# Patient Record
(demographics unavailable — no encounter records)

---

## 2024-11-08 NOTE — HISTORY OF PRESENT ILLNESS
[FreeTextEntry1] : RPA - Hair Loss / Cystic Acne [de-identified] : Adriana Brand 35 y/o F presents for F/U of hair loss and cystic acne. LV 8/6/24  hair loss:  sees improvement, noticing less shedding. noticing less hair around nipple and chin where she used to have more hair.  using topical minoxidil and spironolactone 100mg (started 8/2024)  taking iron supplement daily  Seb derm:  kcn shampoo helps, wants refill  #acne: doing better.  also using Tretinoin every other day.  Hyperpigmentation from prior acne on face and back/shoulders

## 2024-11-08 NOTE — ASSESSMENT
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 - improved Pattern Alopecia is genetically pre-determined and slowly progressive.   Bloodwork previously checked: CBC, ferritin, testosterone DHEA-S, TSH, vitamin D Worsened after OCPs, stopped in 12/2023.   -recommended starting Minoxidil 5% foam daily for 6 months. -start spironolactone 100mg daily (start 50mg qd x 2 weeks first). SED including hypotension, HA, GI symptom, changes in mood/libido/menstrual cycle, hyperkalemia.  -recheck TSH today  #High risk medication  -8/2024 BMP and BP -WNL  #Iron deficiency  -c/w ferrous sulfate 65mg daily  #Cystic acne - stable #PIH Education provided, reviewed the importance to avoid manipulation of acne -c/w spironolactone as above -c/w tret .025% TIW  SED including skin irritation and peeling. Discontinue if pregnant or breastfeeding. Proper application technique reviewed. -start azelaic acid 15% gel BID to AA of acne and hyperpigmentation  #Seborrheic dermatitis - improved Patient reassured this is a benign dermatitis caused by yeast overgrowth.  Discussed the typical chronic nature of this condition with periods of waxing and waning.   For the scalp: - c/w Ketoconazole 2% shampoo to entire scalp every other day.   Patient was instructed to leave on for 5-10 minutes before rinsing off. refilled - c/w Lidex solution to affected areas BID for 2 weeks on 2 week off as needed for flaring. refilled  RTC 3 months

## 2024-11-08 NOTE — HISTORY OF PRESENT ILLNESS
[FreeTextEntry1] : RPA - Hair Loss / Cystic Acne [de-identified] : Adriana Brand 33 y/o F presents for F/U of hair loss and cystic acne. LV 8/6/24  hair loss:  sees improvement, noticing less shedding. noticing less hair around nipple and chin where she used to have more hair.  using topical minoxidil and spironolactone 100mg (started 8/2024)  taking iron supplement daily  Seb derm:  kcn shampoo helps, wants refill  #acne: doing better.  also using Tretinoin every other day.  Hyperpigmentation from prior acne on face and back/shoulders

## 2024-11-08 NOTE — PHYSICAL EXAM
[Alert] : alert [Oriented x 3] : ~L oriented x 3 [Well Nourished] : well nourished [Conjunctiva Non-injected] : conjunctiva non-injected [No Visual Lymphadenopathy] : no visual  lymphadenopathy [No Clubbing] : no clubbing [No Edema] : no edema [No Bromhidrosis] : no bromhidrosis [No Chromhidrosis] : no chromhidrosis [Hair] : Hair [Scalp] : Scalp [Face] : Face [FreeTextEntry3] : scalp: nonscarring alopecia, improved hair denseity  temples and cheeks: hyperpigmented macules

## 2025-02-14 NOTE — PHYSICAL EXAM
[Alert] : alert [Oriented x 3] : ~L oriented x 3 [Well Nourished] : well nourished [Conjunctiva Non-injected] : conjunctiva non-injected [No Visual Lymphadenopathy] : no visual  lymphadenopathy [No Clubbing] : no clubbing [No Edema] : no edema [No Bromhidrosis] : no bromhidrosis [No Chromhidrosis] : no chromhidrosis [Hair] : Hair [Scalp] : Scalp [Face] : Face [FreeTextEntry3] : scalp: nonscarring alopecia, improved hair density. small crusted erosion on frontal scalp. diffuse mild erythema and scaling  temples and cheeks: hyperpigmented macules

## 2025-02-14 NOTE — ASSESSMENT
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 - improved Pattern Alopecia is genetically pre-determined and slowly progressive.   Bloodwork previously checked: CBC, ferritin, testosterone DHEA-S, TSH, vitamin D Worsened after OCPs, stopped in 12/2023.   -recommended starting Minoxidil 5% foam daily for 6 months. -c/w spironolactone 100mg daily (start 50mg qd x 2 weeks first). SED including hypotension, HA, GI symptom, changes in mood/libido/menstrual cycle, hyperkalemia.  Will hold off on increasing dose due to irregular menses -recheck ferritin today  #High risk medication  -8/2024 BMP and BP -WNL  #Iron deficiency  -c/w ferrous sulfate 65mg daily  #Cystic acne - stable #PIH Education provided, reviewed the importance to avoid manipulation of acne prior tx: tret .025.  unable to get azelaic acid, too expensive -c/w spironolactone as above -change tret from 0.025-> 0.05% TIW -start winlevi bid. rx sent to [facility]  #Seborrheic dermatitis - improved but with breakthrough Patient reassured this is a benign dermatitis caused by yeast overgrowth.  Discussed the typical chronic nature of this condition with periods of waxing and waning.   For the scalp: - start using Ketoconazole 2% shampoo more regularly. 2-3x/weekly - c/w Lidex solution to affected areas BID for 2 weeks on 2 week off as needed for flaring. refilled zoryve foam discussed, she declines for now.   RTC 3 months

## 2025-02-14 NOTE — HISTORY OF PRESENT ILLNESS
[FreeTextEntry1] : RPA - Hair Loss / Cystic Acne/ Sebb derm [de-identified] : Adriana Brand 35 y/o F presents for F/U  Overall, everything is better but still flares around menses.  #Dermatitis on scalp comes and goes uses medication when notice dermatitis flaring up. #hair loss: never started minoxidil , still on iron. jamel 100mg daily. Overall improved but notices increased shedding during period. Getting period every other month now, wants to know if its bc of spironolactone. sees gyn for PMDD but has not had follow up recently.  #Seb derm: using kcn shampoo but only as needed. Gets more painful bumps during menses. improves w/ fluocinoinde.  #acne: on tret .025 TIW , azelaic acid was too expensive. breakthroughs during menses ____________________________________________  hair loss and cystic acne. LV 8/6/24 hair loss:  sees improvement, noticing less shedding. noticing less hair around nipple and chin where she used to have more hair.  using topical minoxidil and spironolactone 100mg (started 8/2024)  taking iron supplement daily  Seb derm:  kcn shampoo helps, wants refill  #acne: doing better.  also using Tretinoin every other day.  Hyperpigmentation from prior acne on face and back/shoulders

## 2025-02-23 NOTE — HEALTH RISK ASSESSMENT
[Patient reported PAP Smear was normal] : Patient reported PAP Smear was normal [Monthly or less (1 pt)] : Monthly or less (1 point) [1 or 2 (0 pts)] : 1 or 2 (0 points) [Never (0 pts)] : Never (0 points) [No falls in past year] : Patient reported no falls in the past year [0] : 2) Feeling down, depressed, or hopeless: Not at all (0) [Never] : Never [NO] : No [HIV test declined] : HIV test declined [Hepatitis C test declined] : Hepatitis C test declined [None] : None [Alone] : lives alone [Employed] : employed [Single] : single [Feels Safe at Home] : Feels safe at home [Fully functional (bathing, dressing, toileting, transferring, walking, feeding)] : Fully functional (bathing, dressing, toileting, transferring, walking, feeding) [Fully functional (using the telephone, shopping, preparing meals, housekeeping, doing laundry, using] : Fully functional and needs no help or supervision to perform IADLs (using the telephone, shopping, preparing meals, housekeeping, doing laundry, using transportation, managing medications and managing finances) [Smoke Detector] : smoke detector [Carbon Monoxide Detector] : carbon monoxide detector [Seat Belt] :  uses seat belt [Name: ___] : Health Care Proxy's Name: [unfilled]  [Relationship: ___] : Relationship: [unfilled] [Audit-CScore] : 1 [de-identified] : very active on her jobs - lives on 5th floor walk-up - always active [de-identified] : regular [XDN1Cwbjo] : 0 [EyeExamDate] : 01/ 2018 [Change in mental status noted] : No change in mental status noted [Sexually Active] : not sexually active [MammogramComments] : new britta hx breast Ca.  [PapSmearDate] : 04/23 [BoneDensityComments] : na [ColonoscopyComments] : na [de-identified] : occ tinnitus - ~ once a month [AdvancecareDate] : 02/25

## 2025-02-23 NOTE — PHYSICAL EXAM
[No Acute Distress] : no acute distress [Well-Appearing] : well-appearing [Normal Sclera/Conjunctiva] : normal sclera/conjunctiva [PERRL] : pupils equal round and reactive to light [EOMI] : extraocular movements intact [No Lymphadenopathy] : no lymphadenopathy [No Respiratory Distress] : no respiratory distress  [Clear to Auscultation] : lungs were clear to auscultation bilaterally [Normal Rate] : normal rate  [Regular Rhythm] : with a regular rhythm [Normal S1, S2] : normal S1 and S2 [No Murmur] : no murmur heard [Pedal Pulses Present] : the pedal pulses are present [No Edema] : there was no peripheral edema [Normal Appearance] : normal in appearance [No Masses] : no palpable masses [No Axillary Lymphadenopathy] : no axillary lymphadenopathy [Soft] : abdomen soft [Non Tender] : non-tender [Normal Bowel Sounds] : normal bowel sounds [Normal Axillary Nodes] : no axillary lymphadenopathy [Normal Anterior Cervical Nodes] : no anterior cervical lymphadenopathy [No CVA Tenderness] : no CVA  tenderness [No Spinal Tenderness] : no spinal tenderness [No Joint Swelling] : no joint swelling [Grossly Normal Strength/Tone] : grossly normal strength/tone [No Rash] : no rash [No Focal Deficits] : no focal deficits [Deep Tendon Reflexes (DTR)] : deep tendon reflexes were 2+ and symmetric [Normal Affect] : the affect was normal [de-identified] : neck non-tender [de-identified] : no skin changes

## 2025-02-23 NOTE — HISTORY OF PRESENT ILLNESS
[FreeTextEntry1] : annual CPE [de-identified] : 33 yo F with h/o migraine HA's, irreg menses, PMDD  2/18/25 RE alopecia: has been working with Derm: started on spironolactone last year - Currently on 100 mg QD - has been helping but not as much as she hoped. Dr Cruz suggested increasing to 100 mg BID. Current dose has made her occasionally lightheaded and her perioeds irregular. She is also using topical minoxidil.   Prior 1/2/24 Recently moved to [facility] - now can walk to work. loves it there.  On OCP last April - first few months were great. Afterwards had a lot of trouble adjusting - intermittent spotting. And all PMS issues recurred if she missed even one pill.  now off for for a few weeks.  At this time: energy level fine, no sig PMS sx, one migraine but was on rainy day which was normal.  Still concerned about her hair - feels it is still thinning.  Was taking supplements: b complex, Vit D, iron - stopped all except uses iron patch. also still using biotin.  Reports scalp also getting inflamed intermittently.   Prior 6/14/23 Recently seen by GYN with confirmation of diagnosis of PMDD -started on oral contraceptive as first-line treatment. Started in April so on for ~ 2 months so far.  She reports that since starting the oral contraceptive, she has noticed a significant improvement in her quality of life.  She is sleeping better, her appetite has normalized, and her sense of overall fatigue has resolved.  She reports initially having some spotting in between periods but this has resolved.  Also has noticed small amount of facial acne since starting the OCP  -as per GYN, this may resolve as her body gets more used to the medication. She is concerned about hair loss since this has been an issue for some time.  Has read that OCPs can potentially make hair loss worse. Prior 12/20/23 for past 2+ years - pervasive but fluctuating fatigue, paresthesias in hands and feet, intermittent weakness, feeling jittery, difficulty concentrating.  W/u in progress:  initially seen by Rheum , Dr Wheeler - PE not s/o specific rheumatologic dx -  labs sent to evaluate for possible rheum causes. Was also advised by Rheum to see neurology and psychiatry.  Neuro: Dr Marin Hooker: w/u in progress. Suggested if neuro and Rheum w/u all neg - suggested eval at specialized center for chronic fatigue syndrome such as one at [facility].   By Ms ESQUIVEL's report - these episodes generally start before her menses and then resolves when menses done. Also c/o hair loss.  Started Aug 2021.   Will do bloodwork today to r/o hypothyroid, screen for DM, check lytes, screen for B12/folate def.  RE timing of episodes: consider premenstrual dysphoric syndrome - discussed with Ms ESQUIVEL. PHQ completed today. She will do PHQ again during menses. f/u to evaluate and potentially treat if significant difference.  RE wt loss - increase caloric intake . add snacks and start eating breakfast.  RE occ ringing in her ears: see ENT for tinnitus RE HPM: due for PAP = she will make appt - either here in the building or at [facility].  RE change in vision: referred to ophtho - she will make appt at [facility]

## 2025-02-23 NOTE — ASSESSMENT
[FreeTextEntry1] : 31 yo F with h/o migraine HA's, irreg menses, PMDD  RE alopecia: has been working with Derm: started on spironolactone last year - Currently on 100 mg QD - has been helping but not as much as she hoped. Dr Cruz suggested increasing to 100 mg BID. Current dose has made her occasionally lightheaded and her periods irregular. She is also using topical minoxidil. Discussed risk v benefit of increased dose. She will discuss further with Derm.   RE irreg menses: f/u with Gyn  RE family hx of breast Ca: discussed option of referral to [facility] Wellness and Cancer Prevention Program. She declines for now. Suggested she s/w mom's oncologist regarding when to begin screening and what modalities.  I conducted an annual depression screen using the PHQ 2/9 and discussed results with the patient during this visit.  Screening not suggestive of diagnosis of MDD.   Annual alcohol screen completed this visit using AUDIT C screening tool and results discussed with patient.  Alcohol use within healthy limits.  Healthy drinking guidelines shared with patient t (Female and male overage 65 no more than 3 SSD on any day, no more than 7 per week). Positive reinforcement provided given patient currently within healthy guidelines.

## 2025-02-23 NOTE — HEALTH RISK ASSESSMENT
[Patient reported PAP Smear was normal] : Patient reported PAP Smear was normal [Monthly or less (1 pt)] : Monthly or less (1 point) [1 or 2 (0 pts)] : 1 or 2 (0 points) [Never (0 pts)] : Never (0 points) [No falls in past year] : Patient reported no falls in the past year [0] : 2) Feeling down, depressed, or hopeless: Not at all (0) [Never] : Never [NO] : No [HIV test declined] : HIV test declined [Hepatitis C test declined] : Hepatitis C test declined [None] : None [Alone] : lives alone [Employed] : employed [Single] : single [Feels Safe at Home] : Feels safe at home [Fully functional (bathing, dressing, toileting, transferring, walking, feeding)] : Fully functional (bathing, dressing, toileting, transferring, walking, feeding) [Fully functional (using the telephone, shopping, preparing meals, housekeeping, doing laundry, using] : Fully functional and needs no help or supervision to perform IADLs (using the telephone, shopping, preparing meals, housekeeping, doing laundry, using transportation, managing medications and managing finances) [Smoke Detector] : smoke detector [Carbon Monoxide Detector] : carbon monoxide detector [Seat Belt] :  uses seat belt [Name: ___] : Health Care Proxy's Name: [unfilled]  [Relationship: ___] : Relationship: [unfilled] [Audit-CScore] : 1 [de-identified] : very active on her jobs - lives on 5th floor walk-up - always active [de-identified] : regular [WZZ1Ficov] : 0 [EyeExamDate] : 01/ 2018 [Change in mental status noted] : No change in mental status noted [Sexually Active] : not sexually active [MammogramComments] : new britta hx breast Ca.  [PapSmearDate] : 04/23 [BoneDensityComments] : na [ColonoscopyComments] : na [de-identified] : occ tinnitus - ~ once a month [AdvancecareDate] : 02/25

## 2025-02-23 NOTE — PHYSICAL EXAM
[No Acute Distress] : no acute distress [Well-Appearing] : well-appearing [Normal Sclera/Conjunctiva] : normal sclera/conjunctiva [PERRL] : pupils equal round and reactive to light [EOMI] : extraocular movements intact [No Lymphadenopathy] : no lymphadenopathy [No Respiratory Distress] : no respiratory distress  [Clear to Auscultation] : lungs were clear to auscultation bilaterally [Normal Rate] : normal rate  [Regular Rhythm] : with a regular rhythm [Normal S1, S2] : normal S1 and S2 [No Murmur] : no murmur heard [Pedal Pulses Present] : the pedal pulses are present [No Edema] : there was no peripheral edema [Normal Appearance] : normal in appearance [No Masses] : no palpable masses [No Axillary Lymphadenopathy] : no axillary lymphadenopathy [Soft] : abdomen soft [Non Tender] : non-tender [Normal Bowel Sounds] : normal bowel sounds [Normal Axillary Nodes] : no axillary lymphadenopathy [Normal Anterior Cervical Nodes] : no anterior cervical lymphadenopathy [No CVA Tenderness] : no CVA  tenderness [No Spinal Tenderness] : no spinal tenderness [No Joint Swelling] : no joint swelling [Grossly Normal Strength/Tone] : grossly normal strength/tone [No Rash] : no rash [No Focal Deficits] : no focal deficits [Deep Tendon Reflexes (DTR)] : deep tendon reflexes were 2+ and symmetric [Normal Affect] : the affect was normal [de-identified] : neck non-tender [de-identified] : no skin changes

## 2025-02-23 NOTE — ASSESSMENT
[FreeTextEntry1] : 31 yo F with h/o migraine HA's, irreg menses, PMDD  RE alopecia: has been working with Derm: started on spironolactone last year - Currently on 100 mg QD - has been helping but not as much as she hoped. Dr Cruz suggested increasing to 100 mg BID. Current dose has made her occasionally lightheaded and her periods irregular. She is also using topical minoxidil. Discussed risk v benefit of increased dose. She will discuss further with Derm.   RE irreg menses: f/u with Gyn  RE family hx of breast Ca: discussed option of referral to [facility] Wellness and Cancer Prevention Program. She declines for now. Suggested she s/w mom's oncologist regarding when to begin screening and what modalities.  I conducted an annual depression screen using the PHQ 2/9 and discussed results with the patient during this visit.  Screening not suggestive of diagnosis of MDD.   Annual alcohol screen completed this visit using AUDIT C screening tool and results discussed with patient.  Alcohol use within healthy limits.  Healthy drinking guidelines shared with patient t (Female and male overage 65 no more than 3 SSD on any day, no more than 7 per week). Positive reinforcement provided given patient currently within healthy guidelines.

## 2025-02-23 NOTE — HISTORY OF PRESENT ILLNESS
[FreeTextEntry1] : annual CPE [de-identified] : 33 yo F with h/o migraine HA's, irreg menses, PMDD  2/18/25 RE alopecia: has been working with Derm: started on spironolactone last year - Currently on 100 mg QD - has been helping but not as much as she hoped. Dr Cruz suggested increasing to 100 mg BID. Current dose has made her occasionally lightheaded and her perioeds irregular. She is also using topical minoxidil.   Prior 1/2/24 Recently moved to [facility] - now can walk to work. loves it there.  On OCP last April - first few months were great. Afterwards had a lot of trouble adjusting - intermittent spotting. And all PMS issues recurred if she missed even one pill.  now off for for a few weeks.  At this time: energy level fine, no sig PMS sx, one migraine but was on rainy day which was normal.  Still concerned about her hair - feels it is still thinning.  Was taking supplements: b complex, Vit D, iron - stopped all except uses iron patch. also still using biotin.  Reports scalp also getting inflamed intermittently.   Prior 6/14/23 Recently seen by GYN with confirmation of diagnosis of PMDD -started on oral contraceptive as first-line treatment. Started in April so on for ~ 2 months so far.  She reports that since starting the oral contraceptive, she has noticed a significant improvement in her quality of life.  She is sleeping better, her appetite has normalized, and her sense of overall fatigue has resolved.  She reports initially having some spotting in between periods but this has resolved.  Also has noticed small amount of facial acne since starting the OCP  -as per GYN, this may resolve as her body gets more used to the medication. She is concerned about hair loss since this has been an issue for some time.  Has read that OCPs can potentially make hair loss worse. Prior 12/20/23 for past 2+ years - pervasive but fluctuating fatigue, paresthesias in hands and feet, intermittent weakness, feeling jittery, difficulty concentrating.  W/u in progress:  initially seen by Rheum , Dr Wheeler - PE not s/o specific rheumatologic dx -  labs sent to evaluate for possible rheum causes. Was also advised by Rheum to see neurology and psychiatry.  Neuro: Dr Marin Hooker: w/u in progress. Suggested if neuro and Rheum w/u all neg - suggested eval at specialized center for chronic fatigue syndrome such as one at [facility].   By Ms ESQUIVEL's report - these episodes generally start before her menses and then resolves when menses done. Also c/o hair loss.  Started Aug 2021.   Will do bloodwork today to r/o hypothyroid, screen for DM, check lytes, screen for B12/folate def.  RE timing of episodes: consider premenstrual dysphoric syndrome - discussed with Ms ESQUIVEL. PHQ completed today. She will do PHQ again during menses. f/u to evaluate and potentially treat if significant difference.  RE wt loss - increase caloric intake . add snacks and start eating breakfast.  RE occ ringing in her ears: see ENT for tinnitus RE HPM: due for PAP = she will make appt - either here in the building or at [facility].  RE change in vision: referred to ophtho - she will make appt at [facility]

## 2025-05-16 NOTE — PHYSICAL EXAM
[Alert] : alert [Oriented x 3] : ~L oriented x 3 [Well Nourished] : well nourished [Conjunctiva Non-injected] : conjunctiva non-injected [No Visual Lymphadenopathy] : no visual  lymphadenopathy [No Clubbing] : no clubbing [No Edema] : no edema [No Bromhidrosis] : no bromhidrosis [No Chromhidrosis] : no chromhidrosis [Hair] : Hair [Scalp] : Scalp [Face] : Face [FreeTextEntry3] : scalp: nonscarring alopecia, diffuse mild erythema and scaling  temples and cheeks: hyperpigmented macules , comedones and acneiform papules

## 2025-05-16 NOTE — HISTORY OF PRESENT ILLNESS
[FreeTextEntry1] : RPA - Hair Loss / Cystic Acne/ Seb derm [de-identified] : Adriana Brand 34 y/o F presents for F/U of hair loss, acne and dermatitis on scalp.  -Patient reports using jamel and tretinoin with improvement but started reflaring . increased to tret 0.05 this Monday bc waited to finish prior tube first.  -scalp itching was controlled until recently, started getting itchy again. Restarted fluo solution last month. using 2x/ week.  -recently going through stressful times and thinks may be related to flares __________ LV 02/14/25 Adriana Brand 33 y/o F presents for F/U  Overall, everything is better but still flares around menses.  #Dermatitis on scalp comes and goes uses medication when notice dermatitis flaring up. #hair loss: never started minoxidil , still on iron. jamel 100mg daily. Overall improved but notices increased shedding during period. Getting period every other month now, wants to know if its bc of spironolactone. sees gyn for PMDD but has not had follow up recently.  #Seb derm: using kcn shampoo but only as needed. Gets more painful bumps during menses. improves w/ fluocinoinde.  #acne: on tret .025 TIW , azelaic acid was too expensive. breakthroughs during menses ____________________________________________  hair loss and cystic acne. LV 8/6/24 hair loss:  sees improvement, noticing less shedding. noticing less hair around nipple and chin where she used to have more hair.  using topical minoxidil and spironolactone 100mg (started 8/2024)  taking iron supplement daily  Seb derm:  kcn shampoo helps, wants refill  #acne: doing better.  also using Tretinoin every other day.  Hyperpigmentation from prior acne on face and back/shoulders

## 2025-05-16 NOTE — HISTORY OF PRESENT ILLNESS
[FreeTextEntry1] : RPA - Hair Loss / Cystic Acne/ Seb derm [de-identified] : Adriana Brand 34 y/o F presents for F/U of hair loss, acne and dermatitis on scalp.  -Patient reports using jamel and tretinoin with improvement but started reflaring . increased to tret 0.05 this Monday bc waited to finish prior tube first.  -scalp itching was controlled until recently, started getting itchy again. Restarted fluo solution last month. using 2x/ week.  -recently going through stressful times and thinks may be related to flares __________ LV 02/14/25 Adriana Brand 33 y/o F presents for F/U  Overall, everything is better but still flares around menses.  #Dermatitis on scalp comes and goes uses medication when notice dermatitis flaring up. #hair loss: never started minoxidil , still on iron. jamel 100mg daily. Overall improved but notices increased shedding during period. Getting period every other month now, wants to know if its bc of spironolactone. sees gyn for PMDD but has not had follow up recently.  #Seb derm: using kcn shampoo but only as needed. Gets more painful bumps during menses. improves w/ fluocinoinde.  #acne: on tret .025 TIW , azelaic acid was too expensive. breakthroughs during menses ____________________________________________  hair loss and cystic acne. LV 8/6/24 hair loss:  sees improvement, noticing less shedding. noticing less hair around nipple and chin where she used to have more hair.  using topical minoxidil and spironolactone 100mg (started 8/2024)  taking iron supplement daily  Seb derm:  kcn shampoo helps, wants refill  #acne: doing better.  also using Tretinoin every other day.  Hyperpigmentation from prior acne on face and back/shoulders

## 2025-05-16 NOTE — ASSESSMENT
[FreeTextEntry1] : #Cystic acne - improved but mild flare #PIH Education provided, reviewed the importance to avoid manipulation of acne prior tx: tret .025% unable to get azelaic acid, too expensive Still has not gotten winlevi. too expensive at pharmacy.   -c/w spironolactone 100mg daily as above, declines stronger dose d/t dehydration.  -c/w tret 0.05% TIW (started this week) -will check if PA compelted for winlevi. 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 -stablle Pattern Alopecia is genetically pre-determined and slowly progressive.   Bloodwork previously checked: CBC, ferritin, testosterone DHEA-S, TSH, vitamin D Worsened after OCPs, stopped in 12/2023.  Was recommended but never started topical Minoxidil 5% foam. -c/w spironolactone 100mg daily  #Iron deficiency  2/14/25 ferritin mildly elevated so stopped PO ferrous sulfate  #Seborrheic dermatitis - improved but with breakthrough flare Patient reassured this is a benign dermatitis caused by yeast overgrowth.  Discussed the typical chronic nature of this condition with periods of waxing and waning.   For the scalp: - c/w Ketoconazole 2% shampoo more regularly. 2-3x/weekly - c/w Lidex solution to affected areas BID for 2 weeks on 2 week off as needed for flaring. refilled zoryve foam discussed, she declines for now.   RTC 3 months